# Patient Record
Sex: FEMALE | Race: BLACK OR AFRICAN AMERICAN | URBAN - METROPOLITAN AREA
[De-identification: names, ages, dates, MRNs, and addresses within clinical notes are randomized per-mention and may not be internally consistent; named-entity substitution may affect disease eponyms.]

---

## 2022-01-26 ENCOUNTER — LAB OUTSIDE AN ENCOUNTER (OUTPATIENT)
Dept: URBAN - METROPOLITAN AREA CLINIC 88 | Facility: CLINIC | Age: 32
End: 2022-01-26

## 2022-01-26 ENCOUNTER — WEB ENCOUNTER (OUTPATIENT)
Dept: URBAN - METROPOLITAN AREA CLINIC 88 | Facility: CLINIC | Age: 32
End: 2022-01-26

## 2022-01-26 ENCOUNTER — OFFICE VISIT (OUTPATIENT)
Dept: URBAN - METROPOLITAN AREA CLINIC 88 | Facility: CLINIC | Age: 32
End: 2022-01-26
Payer: COMMERCIAL

## 2022-01-26 ENCOUNTER — DASHBOARD ENCOUNTERS (OUTPATIENT)
Age: 32
End: 2022-01-26

## 2022-01-26 VITALS
BODY MASS INDEX: 32.44 KG/M2 | HEART RATE: 90 BPM | SYSTOLIC BLOOD PRESSURE: 111 MMHG | DIASTOLIC BLOOD PRESSURE: 69 MMHG | TEMPERATURE: 97.9 F | HEIGHT: 64 IN | WEIGHT: 190 LBS

## 2022-01-26 DIAGNOSIS — K59.00 COLONIC CONSTIPATION: ICD-10-CM

## 2022-01-26 DIAGNOSIS — K62.5 RECTAL BLEEDING: ICD-10-CM

## 2022-01-26 PROCEDURE — 99204 OFFICE O/P NEW MOD 45 MIN: CPT | Performed by: NURSE PRACTITIONER

## 2022-01-26 RX ORDER — PHENTERMINE HYDROCHLORIDE 37.5 MG/1
1 TABLET TABLET ORAL ONCE A DAY
Status: ACTIVE | COMMUNITY

## 2022-01-26 NOTE — HPI-TODAY'S VISIT:
Patient presents today for evaluation of rectal bleeding.  Reports first episode as being 3 months ago.  Initially suspected bleeding was due to onset of menstrual cycle.  Episode lasted for one day before resolving.  Describes it as BRRB on tissue after wiping.  Noticed return in bleeding 2 weeks later, lasting for one day again.  Feels episodes are increasing in frequency.  Bleeding is noticed only with defecation.  Denies abdominal pain, changes in bowel habits, fever, chills, rectal pain, straining with defecation, or prolonged periods of time on the toilet.  Voices long history of constipation with stools described as Type 1-2 on BSF scale.  Voices a complete sensation of evacuation.  Defecation occurs about every 2 days.  Last episode 2days ago.  Denies prior colonoscopy or family hx of colon cancer.

## 2022-01-26 NOTE — PHYSICAL EXAM RECTAL:
no external hemorrhoids,  normal tone,  no red blood, small, round stool noted in rectal vault,  No tenderness on CAMERON

## 2022-02-01 PROBLEM — 35298007: Status: ACTIVE | Noted: 2022-01-26

## 2022-02-18 ENCOUNTER — OFFICE VISIT (OUTPATIENT)
Dept: URBAN - METROPOLITAN AREA SURGERY CENTER 24 | Facility: SURGERY CENTER | Age: 32
End: 2022-02-18
Payer: COMMERCIAL

## 2022-02-18 DIAGNOSIS — K92.1 ACUTE MELENA: ICD-10-CM

## 2022-02-18 PROCEDURE — G8907 PT DOC NO EVENTS ON DISCHARG: HCPCS | Performed by: INTERNAL MEDICINE

## 2022-02-18 PROCEDURE — 45378 DIAGNOSTIC COLONOSCOPY: CPT | Performed by: INTERNAL MEDICINE

## 2022-02-18 RX ORDER — PHENTERMINE HYDROCHLORIDE 37.5 MG/1
1 TABLET TABLET ORAL ONCE A DAY
Status: ACTIVE | COMMUNITY